# Patient Record
Sex: MALE | Race: WHITE | NOT HISPANIC OR LATINO | Employment: FULL TIME | URBAN - METROPOLITAN AREA
[De-identification: names, ages, dates, MRNs, and addresses within clinical notes are randomized per-mention and may not be internally consistent; named-entity substitution may affect disease eponyms.]

---

## 2023-10-07 ENCOUNTER — LAB (OUTPATIENT)
Dept: LAB | Facility: LAB | Age: 43
End: 2023-10-07
Payer: COMMERCIAL

## 2023-10-07 DIAGNOSIS — Z12.5 ENCOUNTER FOR SCREENING FOR MALIGNANT NEOPLASM OF PROSTATE: ICD-10-CM

## 2023-10-07 DIAGNOSIS — Z00.00 ENCOUNTER FOR GENERAL ADULT MEDICAL EXAMINATION WITHOUT ABNORMAL FINDINGS: Primary | ICD-10-CM

## 2023-10-07 LAB
ALBUMIN SERPL BCP-MCNC: 4.4 G/DL (ref 3.4–5)
ALP SERPL-CCNC: 73 U/L (ref 33–120)
ALT SERPL W P-5'-P-CCNC: 16 U/L (ref 10–52)
ANION GAP SERPL CALC-SCNC: 14 MMOL/L (ref 10–20)
AST SERPL W P-5'-P-CCNC: 12 U/L (ref 9–39)
BILIRUB SERPL-MCNC: 0.8 MG/DL (ref 0–1.2)
BUN SERPL-MCNC: 25 MG/DL (ref 6–23)
CALCIUM SERPL-MCNC: 9.4 MG/DL (ref 8.6–10.6)
CHLORIDE SERPL-SCNC: 107 MMOL/L (ref 98–107)
CHOLEST SERPL-MCNC: 196 MG/DL (ref 0–199)
CHOLESTEROL/HDL RATIO: 4.6
CO2 SERPL-SCNC: 25 MMOL/L (ref 21–32)
CREAT SERPL-MCNC: 1.21 MG/DL (ref 0.5–1.3)
ERYTHROCYTE [DISTWIDTH] IN BLOOD BY AUTOMATED COUNT: 11.9 % (ref 11.5–14.5)
EST. AVERAGE GLUCOSE BLD GHB EST-MCNC: 97 MG/DL
GFR SERPL CREATININE-BSD FRML MDRD: 78 ML/MIN/1.73M*2
GLUCOSE SERPL-MCNC: 99 MG/DL (ref 74–99)
HBA1C MFR BLD: 5 %
HCT VFR BLD AUTO: 45.5 % (ref 41–52)
HDLC SERPL-MCNC: 42.2 MG/DL
HGB BLD-MCNC: 15.9 G/DL (ref 13.5–17.5)
LDLC SERPL CALC-MCNC: 133 MG/DL (ref 140–190)
MCH RBC QN AUTO: 30.8 PG (ref 26–34)
MCHC RBC AUTO-ENTMCNC: 34.9 G/DL (ref 32–36)
MCV RBC AUTO: 88 FL (ref 80–100)
NON HDL CHOLESTEROL: 154 MG/DL (ref 0–149)
NRBC BLD-RTO: 0 /100 WBCS (ref 0–0)
PLATELET # BLD AUTO: 290 X10*3/UL (ref 150–450)
PMV BLD AUTO: 9.8 FL (ref 7.5–11.5)
POTASSIUM SERPL-SCNC: 4.6 MMOL/L (ref 3.5–5.3)
PROT SERPL-MCNC: 6.9 G/DL (ref 6.4–8.2)
PSA SERPL-MCNC: 0.61 NG/ML
RBC # BLD AUTO: 5.16 X10*6/UL (ref 4.5–5.9)
SODIUM SERPL-SCNC: 141 MMOL/L (ref 136–145)
TRIGL SERPL-MCNC: 104 MG/DL (ref 0–149)
VLDL: 21 MG/DL (ref 0–40)
WBC # BLD AUTO: 7.8 X10*3/UL (ref 4.4–11.3)

## 2023-10-07 PROCEDURE — 84153 ASSAY OF PSA TOTAL: CPT

## 2023-10-07 PROCEDURE — 80061 LIPID PANEL: CPT

## 2023-10-07 PROCEDURE — 85027 COMPLETE CBC AUTOMATED: CPT

## 2023-10-07 PROCEDURE — 83036 HEMOGLOBIN GLYCOSYLATED A1C: CPT

## 2023-10-07 PROCEDURE — 36415 COLL VENOUS BLD VENIPUNCTURE: CPT

## 2023-10-07 PROCEDURE — 80053 COMPREHEN METABOLIC PANEL: CPT

## 2024-02-07 ENCOUNTER — TELEPHONE (OUTPATIENT)
Dept: PRIMARY CARE | Facility: CLINIC | Age: 44
End: 2024-02-07
Payer: COMMERCIAL

## 2024-02-07 DIAGNOSIS — G56.00 CARPAL TUNNEL SYNDROME, UNSPECIFIED LATERALITY: Primary | ICD-10-CM

## 2024-02-07 NOTE — TELEPHONE ENCOUNTER
Patients wife called in requesting a referral due to possible carpel tunnel - stated he has to text a lot for his job and has a lot of pain in his hands. Please advise if referral can be placed.

## 2024-02-29 ENCOUNTER — OFFICE VISIT (OUTPATIENT)
Dept: UROLOGY | Facility: HOSPITAL | Age: 44
End: 2024-02-29
Payer: COMMERCIAL

## 2024-02-29 DIAGNOSIS — R97.20 ELEVATED PSA: Primary | ICD-10-CM

## 2024-02-29 LAB
POC APPEARANCE, URINE: CLEAR
POC BILIRUBIN, URINE: NEGATIVE
POC BLOOD, URINE: NEGATIVE
POC COLOR, URINE: YELLOW
POC GLUCOSE, URINE: NEGATIVE MG/DL
POC KETONES, URINE: NEGATIVE MG/DL
POC LEUKOCYTES, URINE: NEGATIVE
POC NITRITE,URINE: NEGATIVE
POC PH, URINE: 6.5 PH
POC PROTEIN, URINE: NEGATIVE MG/DL
POC SPECIFIC GRAVITY, URINE: >=1.03
POC UROBILINOGEN, URINE: 0.2 EU/DL

## 2024-02-29 PROCEDURE — 99204 OFFICE O/P NEW MOD 45 MIN: CPT | Performed by: STUDENT IN AN ORGANIZED HEALTH CARE EDUCATION/TRAINING PROGRAM

## 2024-02-29 PROCEDURE — 99214 OFFICE O/P EST MOD 30 MIN: CPT | Performed by: STUDENT IN AN ORGANIZED HEALTH CARE EDUCATION/TRAINING PROGRAM

## 2024-02-29 PROCEDURE — 81003 URINALYSIS AUTO W/O SCOPE: CPT | Performed by: STUDENT IN AN ORGANIZED HEALTH CARE EDUCATION/TRAINING PROGRAM

## 2024-02-29 NOTE — PROGRESS NOTES
Subjective   Patient ID: Arsenio Murphy is a 43 y.o. male    HPI  43 y.o. male who presented with a recurrence of blood in his semen, initially noted in 2020 during the COVID pandemic. At that time, he was diagnosed with a presumed prostate inflammation and successfully treated with antibiotics. He underwent a vasectomy in July 2023. Early in 2024, he experienced a similar episode of hematospermia. Unable to see his previous urologist, he [facility] and was prescribed a course of antibiotics, which resolved the issue after an extended two-week course. He denies any issues with urination, erections, or ejaculation. He was referred to oncology in error and subsequently to our clinic for further evaluation. He has no other significant medical history and takes no medications.    The most recent urinalysis, conducted on 2/29/2024, was unremarkable     Review of Systems    All systems were reviewed. Anything negative was noted in the HPI.    Objective   Physical Exam    General: Well developed, well nourished, alert and cooperative, appears in no acute distress   Eyes: Non-injected conjunctiva, sclera clear, no proptosis   Cardiac: Extremities are warm and well perfused. No edema, cyanosis or pallor   Lungs: Breathing is easy, non-labored. Speaking in clear and complete sentences. Normal diaphragmatic movement   MSK: Ambulatory with steady gait, unassisted   Neuro: Alert and oriented to person, place, and time   Psych: Demonstrates good judgment and reason, without hallucinations, abnormal affect or abnormal behaviors   Skin: No obvious lesions, no rashes       No CVA tenderness bilaterally   No suprapubic pain or discomfort   Normal genital exam  Normal testicular exam  Normal CALDERON, no nodule no tenderness     No past medical history on file.      No past surgical history on file.      Assessment/Plan   Hematospermia    43 y.o. male who presents for the above condition, We had a very long and extensive  discussion with the patient regarding the pathophysiology, differential diagnosis, risk factor, management, natural history, incidence and diagnostic work-up of the condition.      No antibiotics are prescribed as there are no signs of prostatitis or urinary infection. The patient is educated on the benign nature of hematospermia and reassured. An MRI of the prostate is discussed but not indicated at this time due to the absence of concerning symptoms and the patient's age. The patient is advised to monitor the situation and return if symptoms persist or recur frequently.    Plan:  - Follow up in 6 months          Scribe Attestation  By signing my name below, IKary Scribe   attest that this documentation has been prepared under the direction and in the presence of Dr. Maxx Toscano

## 2024-03-11 ENCOUNTER — APPOINTMENT (OUTPATIENT)
Dept: UROLOGY | Facility: CLINIC | Age: 44
End: 2024-03-11
Payer: COMMERCIAL

## 2024-04-05 DIAGNOSIS — R79.9: Primary | ICD-10-CM

## 2024-08-06 ENCOUNTER — APPOINTMENT (OUTPATIENT)
Dept: UROLOGY | Facility: HOSPITAL | Age: 44
End: 2024-08-06
Payer: COMMERCIAL

## 2024-08-13 ENCOUNTER — APPOINTMENT (OUTPATIENT)
Dept: UROLOGY | Facility: HOSPITAL | Age: 44
End: 2024-08-13
Payer: COMMERCIAL

## 2024-09-26 ENCOUNTER — OFFICE VISIT (OUTPATIENT)
Dept: PRIMARY CARE | Facility: CLINIC | Age: 44
End: 2024-09-26
Payer: COMMERCIAL

## 2024-09-26 VITALS
SYSTOLIC BLOOD PRESSURE: 120 MMHG | WEIGHT: 195 LBS | TEMPERATURE: 97.8 F | DIASTOLIC BLOOD PRESSURE: 76 MMHG | OXYGEN SATURATION: 98 % | HEART RATE: 67 BPM | BODY MASS INDEX: 27.2 KG/M2

## 2024-09-26 DIAGNOSIS — Z00.00 ADULT GENERAL MEDICAL EXAM: Primary | ICD-10-CM

## 2024-09-26 DIAGNOSIS — Z12.5 PROSTATE CANCER SCREENING: ICD-10-CM

## 2024-09-26 DIAGNOSIS — Z83.79 FAMILY HISTORY OF ULCERATIVE COLITIS: ICD-10-CM

## 2024-09-26 DIAGNOSIS — M25.561 ACUTE PAIN OF RIGHT KNEE: ICD-10-CM

## 2024-09-26 PROCEDURE — 99396 PREV VISIT EST AGE 40-64: CPT | Performed by: INTERNAL MEDICINE

## 2024-09-26 PROCEDURE — 1036F TOBACCO NON-USER: CPT | Performed by: INTERNAL MEDICINE

## 2024-09-26 ASSESSMENT — PROMIS GLOBAL HEALTH SCALE
CARRYOUT_PHYSICAL_ACTIVITIES: COMPLETELY
RATE_GENERAL_HEALTH: EXCELLENT
RATE_SOCIAL_SATISFACTION: EXCELLENT
RATE_QUALITY_OF_LIFE: EXCELLENT
RATE_PHYSICAL_HEALTH: VERY GOOD
EMOTIONAL_PROBLEMS: NEVER
RATE_MENTAL_HEALTH: EXCELLENT
RATE_AVERAGE_PAIN: 0
CARRYOUT_SOCIAL_ACTIVITIES: EXCELLENT

## 2024-09-26 ASSESSMENT — PAIN SCALES - GENERAL: PAINLEVEL: 0-NO PAIN

## 2024-09-26 ASSESSMENT — PATIENT HEALTH QUESTIONNAIRE - PHQ9
SUM OF ALL RESPONSES TO PHQ9 QUESTIONS 1 AND 2: 0
2. FEELING DOWN, DEPRESSED OR HOPELESS: NOT AT ALL
1. LITTLE INTEREST OR PLEASURE IN DOING THINGS: NOT AT ALL

## 2024-09-26 NOTE — PROGRESS NOTES
Subjective   Patient ID: Arsenio Murphy is a 43 y.o. male who presents for Annual Exam.    HPI  1.  Physical exam.  Pt states that he played in a pickle ball tournament and started to experience right knee pain shortly thereafter. He denies swelling, but is concerned about injury. He is interested in an MRI, but does not have time for physical therapy if his insurance requires it as a prerequisite.   Pt has a family history of UC and is requesting a C-scope.  Immunizations: declines flu shot    Review of Systems  All pertinent positive symptoms are included in the history of present illness.    All other systems have been reviewed and are negative and noncontributory to this patient's current ailments.    History reviewed. No pertinent past medical history.  History reviewed. No pertinent surgical history.  Social History     Tobacco Use    Smoking status: Never    Smokeless tobacco: Never     Family History   Problem Relation Name Age of Onset    Ulcerative colitis Father       No Known Allergies  Immunization History   Administered Date(s) Administered    Pfizer Purple Cap SARS-CoV-2 04/12/2021, 05/04/2021, 11/14/2021     No current outpatient medications  Objective   Visit Vitals  /76   Pulse 67   Temp 36.6 °C (97.8 °F)   Wt 88.5 kg (195 lb)   SpO2 98%   BMI 27.20 kg/m²   Smoking Status Never   BSA 2.11 m²     No visits with results within 1 Month(s) from this visit.   Latest known visit with results is:   Office Visit on 02/29/2024   Component Date Value    POC Color, Urine 02/29/2024 Yellow     POC Appearance, Urine 02/29/2024 Clear     POC Glucose, Urine 02/29/2024 NEGATIVE     POC Bilirubin, Urine 02/29/2024 NEGATIVE     POC Ketones, Urine 02/29/2024 NEGATIVE     POC Specific Gravity, Ur* 02/29/2024 >=1.030     POC Blood, Urine 02/29/2024 NEGATIVE     POC PH, Urine 02/29/2024 6.5     POC Protein, Urine 02/29/2024 NEGATIVE     POC Urobilinogen, Urine 02/29/2024 0.2     Poc Nitrite, Urine 02/29/2024  NEGATIVE     POC Leukocytes, Urine 02/29/2024 NEGATIVE      Physical Exam  CONSTITUTIONAL - well nourished, well developed, looks like stated age, in no acute distress, not ill-appearing, and not tired appearing  SKIN - normal skin color and pigmentation, normal skin turgor without rash, lesions, or nodules visualized  HEAD - no trauma, normocephalic  EYES - pupils are equal and reactive to light, extraocular muscles are intact, and normal external exam  ENT - TM's intact, no injection, no signs of infection, uvula midline, normal tongue movement and throat normal, no exudate, nasal passage without discharge and patent  NECK - supple without rigidity, no neck mass was observed, no thyromegaly or thyroid nodules  CHEST - clear to auscultation, no wheezing, no crackles and no rales, good effort  CARDIAC - regular rate and regular rhythm, no skipped beats, no murmur  ABDOMEN - no organomegaly, soft, nontender, nondistended, normal bowel sounds, no guarding/rebound/rigidity, negative McBurney sign and negative Heaton sign  EXTREMITIES - no edema, no deformities, FROM, no tenderness  NEUROLOGICAL - normal gait, normal balance, normal motor, no ataxia; alert, oriented and no focal signs  PSYCHIATRIC - alert, pleasant and cordial, age-appropriate  IMMUNOLOGIC - no cervical lymphadenopathy    Assessment/Plan   Problem List Items Addressed This Visit    None  Visit Diagnoses       Adult general medical exam    -  Primary    Relevant Orders    Lipid Panel    Comprehensive Metabolic Panel    CBC    Acute pain of right knee        Relevant Orders    XR knee right 3 views    MR knee right wo IV contrast    Referral to Sports Medicine    Family history of ulcerative colitis        Relevant Orders    Colonoscopy Screening; Average Risk Patient    Prostate cancer screening        Relevant Orders    PSA

## 2024-10-07 ENCOUNTER — HOSPITAL ENCOUNTER (OUTPATIENT)
Dept: RADIOLOGY | Facility: CLINIC | Age: 44
Discharge: HOME | End: 2024-10-07
Payer: COMMERCIAL

## 2024-10-07 ENCOUNTER — LAB (OUTPATIENT)
Dept: LAB | Facility: LAB | Age: 44
End: 2024-10-07
Payer: COMMERCIAL

## 2024-10-07 DIAGNOSIS — M25.561 ACUTE PAIN OF RIGHT KNEE: ICD-10-CM

## 2024-10-07 DIAGNOSIS — Z00.00 ADULT GENERAL MEDICAL EXAM: ICD-10-CM

## 2024-10-07 DIAGNOSIS — Z12.5 PROSTATE CANCER SCREENING: ICD-10-CM

## 2024-10-07 LAB
ALBUMIN SERPL BCP-MCNC: 4.7 G/DL (ref 3.4–5)
ALP SERPL-CCNC: 61 U/L (ref 33–120)
ALT SERPL W P-5'-P-CCNC: 16 U/L (ref 10–52)
ANION GAP SERPL CALC-SCNC: 10 MMOL/L (ref 10–20)
AST SERPL W P-5'-P-CCNC: 15 U/L (ref 9–39)
BILIRUB SERPL-MCNC: 1.7 MG/DL (ref 0–1.2)
BUN SERPL-MCNC: 14 MG/DL (ref 6–23)
CALCIUM SERPL-MCNC: 9.3 MG/DL (ref 8.6–10.6)
CHLORIDE SERPL-SCNC: 103 MMOL/L (ref 98–107)
CHOLEST SERPL-MCNC: 189 MG/DL (ref 0–199)
CHOLESTEROL/HDL RATIO: 3.9
CO2 SERPL-SCNC: 31 MMOL/L (ref 21–32)
CREAT SERPL-MCNC: 0.93 MG/DL (ref 0.5–1.3)
EGFRCR SERPLBLD CKD-EPI 2021: >90 ML/MIN/1.73M*2
ERYTHROCYTE [DISTWIDTH] IN BLOOD BY AUTOMATED COUNT: 11.9 % (ref 11.5–14.5)
GLUCOSE SERPL-MCNC: 91 MG/DL (ref 74–99)
HCT VFR BLD AUTO: 45.5 % (ref 41–52)
HDLC SERPL-MCNC: 48.9 MG/DL
HGB BLD-MCNC: 15.3 G/DL (ref 13.5–17.5)
LDLC SERPL CALC-MCNC: 119 MG/DL
MCH RBC QN AUTO: 30.2 PG (ref 26–34)
MCHC RBC AUTO-ENTMCNC: 33.6 G/DL (ref 32–36)
MCV RBC AUTO: 90 FL (ref 80–100)
NON HDL CHOLESTEROL: 140 MG/DL (ref 0–149)
NRBC BLD-RTO: 0 /100 WBCS (ref 0–0)
PLATELET # BLD AUTO: 258 X10*3/UL (ref 150–450)
POTASSIUM SERPL-SCNC: 4.3 MMOL/L (ref 3.5–5.3)
PROT SERPL-MCNC: 6.9 G/DL (ref 6.4–8.2)
PSA SERPL-MCNC: 0.43 NG/ML
RBC # BLD AUTO: 5.06 X10*6/UL (ref 4.5–5.9)
SODIUM SERPL-SCNC: 140 MMOL/L (ref 136–145)
TRIGL SERPL-MCNC: 108 MG/DL (ref 0–149)
VLDL: 22 MG/DL (ref 0–40)
WBC # BLD AUTO: 5.3 X10*3/UL (ref 4.4–11.3)

## 2024-10-07 PROCEDURE — 84153 ASSAY OF PSA TOTAL: CPT

## 2024-10-07 PROCEDURE — 80061 LIPID PANEL: CPT

## 2024-10-07 PROCEDURE — 36415 COLL VENOUS BLD VENIPUNCTURE: CPT

## 2024-10-07 PROCEDURE — 73562 X-RAY EXAM OF KNEE 3: CPT | Mod: RIGHT SIDE | Performed by: RADIOLOGY

## 2024-10-07 PROCEDURE — 73562 X-RAY EXAM OF KNEE 3: CPT | Mod: RT

## 2024-10-07 PROCEDURE — 85027 COMPLETE CBC AUTOMATED: CPT

## 2024-10-07 PROCEDURE — 80053 COMPREHEN METABOLIC PANEL: CPT

## 2024-10-10 ENCOUNTER — OFFICE VISIT (OUTPATIENT)
Dept: SPORTS MEDICINE | Facility: CLINIC | Age: 44
End: 2024-10-10
Payer: COMMERCIAL

## 2024-10-10 VITALS
SYSTOLIC BLOOD PRESSURE: 120 MMHG | HEIGHT: 71 IN | WEIGHT: 195 LBS | BODY MASS INDEX: 27.3 KG/M2 | DIASTOLIC BLOOD PRESSURE: 80 MMHG | HEART RATE: 68 BPM

## 2024-10-10 DIAGNOSIS — M71.21 BAKER'S CYST OF KNEE, RIGHT: ICD-10-CM

## 2024-10-10 DIAGNOSIS — M21.70 LEG LENGTH DISCREPANCY: ICD-10-CM

## 2024-10-10 DIAGNOSIS — M25.561 ACUTE PAIN OF RIGHT KNEE: ICD-10-CM

## 2024-10-10 DIAGNOSIS — S83.8X2A ACUTE MEDIAL MENISCAL INJURY OF KNEE, LEFT, INITIAL ENCOUNTER: ICD-10-CM

## 2024-10-10 DIAGNOSIS — M25.861 PATELLOFEMORAL DYSFUNCTION OF RIGHT KNEE: Primary | ICD-10-CM

## 2024-10-10 DIAGNOSIS — S83.419A SPRAIN OF MEDIAL COLLATERAL LIGAMENT OF KNEE, INITIAL ENCOUNTER: ICD-10-CM

## 2024-10-10 PROCEDURE — 3008F BODY MASS INDEX DOCD: CPT | Performed by: FAMILY MEDICINE

## 2024-10-10 PROCEDURE — 1036F TOBACCO NON-USER: CPT | Performed by: FAMILY MEDICINE

## 2024-10-10 PROCEDURE — 99214 OFFICE O/P EST MOD 30 MIN: CPT | Performed by: FAMILY MEDICINE

## 2024-10-10 ASSESSMENT — ENCOUNTER SYMPTOMS
OCCASIONAL FEELINGS OF UNSTEADINESS: 0
LOSS OF SENSATION IN FEET: 0
DEPRESSION: 0

## 2024-10-10 ASSESSMENT — PAIN SCALES - GENERAL
PAINLEVEL: 6
PAINLEVEL_OUTOF10: 1

## 2024-10-10 ASSESSMENT — COLUMBIA-SUICIDE SEVERITY RATING SCALE - C-SSRS
6. HAVE YOU EVER DONE ANYTHING, STARTED TO DO ANYTHING, OR PREPARED TO DO ANYTHING TO END YOUR LIFE?: NO
1. IN THE PAST MONTH, HAVE YOU WISHED YOU WERE DEAD OR WISHED YOU COULD GO TO SLEEP AND NOT WAKE UP?: NO
2. HAVE YOU ACTUALLY HAD ANY THOUGHTS OF KILLING YOURSELF?: NO

## 2024-10-10 ASSESSMENT — PATIENT HEALTH QUESTIONNAIRE - PHQ9
1. LITTLE INTEREST OR PLEASURE IN DOING THINGS: NOT AT ALL
SUM OF ALL RESPONSES TO PHQ9 QUESTIONS 1 AND 2: 0
2. FEELING DOWN, DEPRESSED OR HOPELESS: NOT AT ALL

## 2024-10-10 ASSESSMENT — PAIN - FUNCTIONAL ASSESSMENT: PAIN_FUNCTIONAL_ASSESSMENT: 0-10

## 2024-10-10 ASSESSMENT — PAIN DESCRIPTION - DESCRIPTORS: DESCRIPTORS: ACHING;TENDER

## 2024-10-10 NOTE — PROGRESS NOTES
Verbal consent of the patient and/or verbal parental consent for patients under the age of 18 have been obtained to conduct a physical examination at this office visit.    New patient  History Of Present Illness  10/10/24 Arsenio Murphy is a 43 y.o. male who presents for an evaluation of their Right knee.  He was graciously referred by Janae Goncalves MD. Has MRI of R knee scheduled for 10/16/24. States that he has been dealing with right knee pain since August. 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 No mechanism of injury and does not recall pain 24 days post activity. States that his pain occurred later that week.  States that end of ROM he has pain and limited ROM. States that he is at least finally able to bear weight fully no pain. States his pain is located posteriorly medially. States that his pain has progressively gotten better however he says periodically off and on it still flares and up tremendously and is 10 out of 10 pain. States that he has rested and has only been light walking. He is active with pickelball golfer and walker but it is affecting his activities of daily living as well as work. States that he has a tender 1/10 pain normally, if he hyperflexes he flares up to a 6/10. No history of knee injury. No pops snaps or cracks. States for pain management he has decreased activity.  NSAIDs and Tylenol does not seem to help or ice or heat.    All previous Progress Notes and imaging results related to this patients chief complaint have been reviewed in preparation for this examination.    Past Medical History  He has no past medical history on file.    Surgical History  He has no past surgical history on file.     Social History  He reports that he has never smoked. He has never used smokeless tobacco. No history on file for alcohol use and drug use.    Family History  Family History   Problem Relation Name Age of Onset    Ulcerative colitis Father           Allergies  Patient has no known allergies.    Review of Systems  CONSTITUTIONAL:   Negative for weight change, loss of appetite, fatigue, weakness, fever, chills, night sweats, headaches .           HEENT:   Negative for cold, cough, sore throat, sinus pain, swollen lymph nodes.           OPHTHALMOLOGY:   Negative for diminished vision, blurred vision, loss of vision, double vision.           ALLERGY:   Negative for runny nose, scratchy throat, sinus congestion, rash, facial pressure, nasal congestion, post-nasal drip.           CARDIOLOGY:   Negative for chest pain, palpitations, murmurs, irregular heart beat, shortness of breath, leg edema, dyspnea on exertion, fatigue, dizziness.           RESPIRATORY:   Negative for chest pain, shortness of breath, swelling of the legs, asthma/copd, chest congestion, pain with breathing .           GASTROENTEROLOGY:   Negative for nausea, vomitting, heartburn, constipation, diarrhea, blood in stool, change in bowel habits, black stool.           HEMATOLOGY/LYMPH:   Negative for fatigue, loss of appetitie, easy bruising, easy bleeding, anemia, abnormal bleeding, slow healing.           ENDOCRINOLOGY:   Negative for polyuria, polydipsia, polyphagia, fatigue, weight loss, weight gain, cold intolerance, heat intolerance, diabetes.           MUSCULOSKELETAL:   Positive  for Right knee        DERMATOLOGY:   Negative for rash, bruising.           NEUROLOGY:   Negative for tingling, numbness, gait abnormality, paresthesias, weakness, sciatica.        Examination:  Right Medial Joint Line over the Medial Meniscus and Posterior Midline of the Knee  Edema: Positive  Diffusely.   Effusion: Negative.   Percussion Test:  Negative.  Tuning Fork Test:  Negative.  Ecchymosis/Bruising: Negative.            Palpation:   Positive  Right  Medial Joint Line over the Medial Meniscus and Posterior Midline of the Knee    Orientation: Asymmetrical: because of the swelling.     Range of Motion:     Positive Knee Flexion ( 0-135 degrees) Decreased because of pain and swelling  Positive Knee Extension ( 0-15 degrees) Decreased because of pain and swelling           Muscle Strength:    Positive +4/+5 Quadricep Extension Causes pain  Positive +4/+5 Hamstring Flexion Causes pain  +5+5 Gastrocnemius  +5+5 Soleus.            Proprioception:   Pain with Squat: Positive   Pain with Sumo Squat:Positive   Hop Test: Negative  Single leg balance test Negative           Vascular:   +2/+4 Femoral  +2/+4 Dorsalis Pedis  +2/+4 Posterior Tibial  Capillary Refill less than 2 seconds.                Knee - ACL:  Anterior Drawer Test: Negative  Lachman Test: Negative  Lelli Test: Negative               KNEE - MCL / LCL:  Valgus Stress Test: 90 degrees: Positive  20-30 degrees: Positive    Varus Stress Test:  90 degrees: Negative, 20-30 degrees: Negative.   Apley Distraction Test: Positive  Medial.   Thessaly Test: Positive  Origin and Insertion MCL, Anterior Medial Meniscus, Posterior Medial Meniscus, Distal Hamstring.            KNEE - MENISCUS:  Apley Compression Test:  Positive Medial joint line.          Stienmann Test:  Positive   Lesvia Test:  Positive Medial joint line.         Bounce Home Test:  Negative   Thessaly Test:  Negative            KNEE - PATELLA:  Apprehension Test:  Positive   Glide Test:  Negative  Grind Test: Positive   Patella Tracking TestPositive              KNEE - QUADRICEPS:         VMO Test: Positive   VLO Test:  Negative    Hip/Pelvis - Sacrum:  Leg Length Supine: Positive LEFT leg shorter than the Right    Leg Length Supine to Seated (Derbolowsky Sign): Positive LEFT leg shorter than the Right   Standing Flexion Test: Positive Positive Right  Seated Flexion Test: Positive Positive Right  Spring Test: Negative   Sacral Somatic Dysfunction: Positive LrLa: Left rotation Left axis  Hip Flexor Tightness: Positive RIGHT > Left  Hamstring Tightness: Positive LEFT > Right          Feet/Foot: Positive  BILATERAL Valgus foot        Imaging and Diagnostics Review:  Standing erect pelvis x-ray ordered for leg length discrepancy   MRI Scheduled for 10/16/24    Assessment   1. Patellofemoral dysfunction of right knee        2. Acute pain of right knee  Referral to Sports Medicine    XR pelvis 1-2 views    Referral to Physical Therapy      3. Baker's cyst of knee, right  XR pelvis 1-2 views    Referral to Physical Therapy      4. Sprain of medial collateral ligament of knee, initial encounter  XR pelvis 1-2 views    Referral to Physical Therapy      5. Acute medial meniscal injury of knee, left, initial encounter  XR pelvis 1-2 views    Referral to Physical Therapy      6. Leg length discrepancy            Treatment or Intervention:  May continue to alternate ice and moist heat as needed  ,   Reviewed handout degenerative joint disease of the knees, meniscus injury, patellofemoral tracking syndrome, and/or patellar tendinitis in detail with the patient to the level of their understanding; a copy of this handout was provided to the patient at the time of this office visit.  ,   Start into Physical Therapy 1-2 times a week for 8-10 weeks with manual therapy as well as dry needling and IASTM especially VMO and adductor strengthening  Reviewed home exercises to be performed by the patient routinely  ,   Stressed the importance of wearing shoes with good stability control to help with the biomechanics affecting the knees and lower extremities  ,   Stressed the importance of wearing full foot insoles to help with the biomechanics affecting the knees and lower extremities  ,   Recommendation over-the-counter  vitamin-D 2 -3000+ milligrams a day, as well as a daily multivitamin.  ,   Recommendation over-the-counter curcumin, turmeric, boswellia, as well as egg shell membrane as directed to aid with joint inflammation.  ,   Recommendation over-the-counter Move Free for joint health.  ,    Discussed in detail with the patient to  the level of their understanding the possibility in the future of regenerative injections versus corticosteroid injections versus viscosupplementation injections versus a combination  ,   AT the patient's next office visit, will provide appropriate __ millimeter heel lift to be placed in the RIGHT/LEFT shoe to accommodate for leg length discrepancy found on standing erect pelvis xray  , and   FOLLOW UP AFTER MRI left knee  Please note that this report has been produced using speech recognition software.  It may contain errors related to grammar, punctuation or spelling.  Electronically signed, but not reviewed.  SOSA Jasso, Director of Sports Medicine      RM FARLEY on 10/10/24 at 2:31 PM.     HERMELINDA Jasso DO

## 2024-10-10 NOTE — PATIENT INSTRUCTIONS
May continue to alternate ice and moist heat as needed  ,   Reviewed handout degenerative joint disease of the knees, patellofemoral tracking syndrome, and/or patellar tendinitis in detail with the patient to the level of their understanding; a copy of this handout was provided to the patient at the time of this office visit.  ,   Start into Physical Therapy 1-2 times a week for 8-10 weeks with manual therapy as well as dry needling and IASTM  ,   Reviewed home exercises to be performed by the patient routinely  ,   Stressed the importance of wearing shoes with good stability control to help with the biomechanics affecting the knees and lower extremities  ,   Stressed the importance of wearing full foot insoles to help with the biomechanics affecting the knees and lower extremities  ,   Recommendation over-the-counter calcium with vitamin-D 2 -3000+ milligrams a day, as well as OTC symphytum as directed daily to promote bony healing, in addition to a daily multivitamin.  ,   Recommendation over-the-counter curcumin, turmeric, boswellia, as well as egg shell membrane as directed to aid with joint inflammation.  ,   Recommendation over-the-counter Move Free for joint health.  ,    Discussed in detail with the patient to the level of their understanding the possibility in the future of regenerative injections versus corticosteroid injections versus viscosupplementation injections versus a combination  ,   AT the patient's next office visit, will provide appropriate __ millimeter heel lift to be placed in the RIGHT/LEFT shoe to accommodate for leg length discrepancy found on standing erect pelvis xray  , and   FOLLOW UP AFTER MRI

## 2024-10-16 ENCOUNTER — HOSPITAL ENCOUNTER (OUTPATIENT)
Dept: RADIOLOGY | Facility: CLINIC | Age: 44
Discharge: HOME | End: 2024-10-16
Payer: COMMERCIAL

## 2024-10-16 DIAGNOSIS — M25.561 ACUTE PAIN OF RIGHT KNEE: ICD-10-CM

## 2024-10-16 PROCEDURE — 73721 MRI JNT OF LWR EXTRE W/O DYE: CPT | Mod: RT

## 2024-10-16 PROCEDURE — 73721 MRI JNT OF LWR EXTRE W/O DYE: CPT | Mod: RIGHT SIDE | Performed by: RADIOLOGY

## 2024-10-16 NOTE — PROGRESS NOTES
Verbal consent of the patient and/or verbal parental consent for patients under the age of 18 have been obtained to conduct a physical examination at this office visit.    Established patient  History Of Present Illness  10/18/24 Arsenio Murphy is a 43 y.o. male who presents for an MRI follow up of their Right knee. States that he Is having same amount of pain and discomfort. No new or worsening symptoms.  He has not started into physical therapy yet.  He also has not started on any other supplements yet either.  We talked in detail about his MRI results as well as the exercises I talked him about previously to strengthen his VMO and his adductors.  Also told him I recommended he get started into physical therapy right away.  We also talked about joint lubrication shots and regenerative injections potentially in the future.  He continues to wear knee sleeve with activities such as pickleball and basketball.      All previous Progress Notes and imaging results related to this patients chief complaint have been reviewed in preparation for this examination.    Past Medical History  He has no past medical history on file.    Surgical History  He has no past surgical history on file.     Social History  He reports that he has never smoked. He has never used smokeless tobacco. No history on file for alcohol use and drug use.    Family History  Family History   Problem Relation Name Age of Onset    Ulcerative colitis Father          Allergies  Patient has no known allergies.    History Of Present Illness  10/10/24 Arsenio Murphy is a 43 y.o. male who presents for an evaluation of their Right knee.  He was graciously referred by Janae Goncalves MD. Has MRI of R knee scheduled for 10/16/24. States that he has been dealing with right knee pain since August. 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 No mechanism of injury and does not recall pain 24 days post activity. States that his pain  occurred later that week.  States that end of ROM he has pain and limited ROM. States that he is at least finally able to bear weight fully no pain. States his pain is located posteriorly medially. States that his pain has progressively gotten better however he says periodically off and on it still flares and up tremendously and is 10 out of 10 pain. States that he has rested and has only been light walking. He is active with pickelball golfer and walker but it is affecting his activities of daily living as well as work. States that he has a tender 1/10 pain normally, if he hyperflexes he flares up to a 6/10. No history of knee injury. No pops snaps or cracks. States for pain management he has decreased activity.  NSAIDs and Tylenol does not seem to help or ice or heat.    Review of Systems  CONSTITUTIONAL:   Negative for weight change, loss of appetite, fatigue, weakness, fever, chills, night sweats, headaches .           HEENT:   Negative for cold, cough, sore throat, sinus pain, swollen lymph nodes.           OPHTHALMOLOGY:   Negative for diminished vision, blurred vision, loss of vision, double vision.           ALLERGY:   Negative for runny nose, scratchy throat, sinus congestion, rash, facial pressure, nasal congestion, post-nasal drip.           CARDIOLOGY:   Negative for chest pain, palpitations, murmurs, irregular heart beat, shortness of breath, leg edema, dyspnea on exertion, fatigue, dizziness.           RESPIRATORY:   Negative for chest pain, shortness of breath, swelling of the legs, asthma/copd, chest congestion, pain with breathing .           GASTROENTEROLOGY:   Negative for nausea, vomitting, heartburn, constipation, diarrhea, blood in stool, change in bowel habits, black stool.           HEMATOLOGY/LYMPH:   Negative for fatigue, loss of appetitie, easy bruising, easy bleeding, anemia, abnormal bleeding, slow healing.           ENDOCRINOLOGY:   Negative for polyuria, polydipsia, polyphagia,  fatigue, weight loss, weight gain, cold intolerance, heat intolerance, diabetes.           MUSCULOSKELETAL:   Positive  for Right knee        DERMATOLOGY:   Negative for rash, bruising.           NEUROLOGY:   Negative for tingling, numbness, gait abnormality, paresthesias, weakness, sciatica.        Examination: All findings relatively unchanged since previous visit  Right Medial Joint Line over the Medial Meniscus and Posterior Midline of the Knee  Edema: Negative  Effusion: Negative.   Percussion Test:  Negative.  Tuning Fork Test:  Negative.  Ecchymosis/Bruising: Negative.            Palpation:   Positive  Right  Medial Joint Line over the Medial Meniscus  Orientation: Symmetrical    Range of Motion:  FROM  Knee Flexion ( 0-135 degrees)   Knee Extension ( 0-15 degrees)            Muscle Strength:    +5/+5 Quadricep Extension   +5/+5 Hamstring Flexion   +5+5 Gastrocnemius  +5+5 Soleus.            Proprioception:  All findings relatively unchanged since previous visit  Pain with Squat: Positive   Pain with Sumo Squat:Positive   Hop Test: Negative  Single leg balance test Negative           Vascular:   +2/+4 Femoral  +2/+4 Dorsalis Pedis  +2/+4 Posterior Tibial  Capillary Refill less than 2 seconds.                Knee - ACL:  Anterior Drawer Test: Negative  Lachman Test: Negative  Lelli Test: Negative               KNEE - MCL / LCL: All findings relatively unchanged since previous visit  Valgus Stress Test: 90 degrees: Negative  Varus Stress Test:  90 degrees: Negative, 20-30 degrees: Negative.   Apley Distraction Test: Negative  Thessaly Test: Negative           KNEE - MENISCUS: All findings relatively unchanged since previous visit  Apley Compression Test:  Positive Medial joint line.          Stienmann Test:  Positive   Lesvia Test:  Positive Medial joint line.         Bounce Home Test:  Negative   Thessaly Test:  Negative            KNEE - PATELLA: All findings relatively unchanged since previous  visit  Apprehension Test:  Positive   Glide Test:  Negative  Grind Test: Positive   Patella Tracking TestPositive              KNEE - QUADRICEPS:        All findings relatively unchanged since previous visit  VMO Test: Positive   VLO Test:  Negative    Hip/Pelvis - Sacrum:  Leg Length Supine: Positive LEFT leg shorter than the Right will verify with standing erect pelvis x-ray at a later date  Leg Length Supine to Seated (Derbolowsky Sign): Positive LEFT leg shorter than the Right will verify with standing erect pelvis x-ray at a later date    Hip Flexor Tightness: Positive RIGHT > Left  Hamstring Tightness: Positive LEFT > Right          Feet/Foot: Positive BILATERAL Valgus foot        Imaging and Diagnostics Review:  Interpreted By:  Matt Oshea and Baker Zachary   STUDY:  MRI of the  right knee without IV contrast;  10/16/2024 12:08 pm      INDICATION:  Signs/Symptoms:knee pain.      ,M25.561 Pain in right knee      COMPARISON:  Right knee radiographs on 10/07/2024.      ACCESSION NUMBER(S):  [id number]      ORDERING CLINICIAN:  GO HUGHES      TECHNIQUE:  MR imaging of the  right knee was obtained  without IV contrast.      FINDINGS:  LIGAMENTS AND TENDONS:  The anterior cruciate ligament demonstrates slight thickening and  increased intrasubstance signal without tear. The posterior cruciate  ligaments are intact. The medial collateral ligament is intact. The  lateral collateral ligament, the biceps femoris tendon, the popliteus  tendon and the iliotibial band are intact. The patellar tendon is  intact. There is mild increased intermediate signal intensity in the  insertional quadriceps tendon which is felt to be greater than the  normal fibrofatty striation particularly of the central to medial  insertion.      MENISCI:  The medial meniscus is intact and without evidence of tear.  The lateral meniscus is intact and without evidence of tear.      JOINTS:  The articular cartilage of the medial femoral  condyle and medial  tibial plateau is intact and without evidence of full thickness  defect. The articular cartilage of the lateral femoral condyle and  lateral tibial plateau is intact and without evidence of full  thickness defect. The patellofemoral articulation is intact and  without evidence of subluxation or articular cartilage defect. No  evidence of significant joint effusion or popliteal cyst.      OSSEOUS STRUCTURES:  Mild subchondral cyst-like changes within the proximal central tibia,  along the anterior cruciate ligament attachment site. There is  superior patellar enthesophyte formation. Marrow signal is otherwise  within normal limits. There is no acute fracture.      SOFT TISSUES:  Multiloculated cystic structure within the soft tissues along the  posterolateral distal femoral metaphysis measuring approximately 2.7  x 2.1 cm (series 4, image 11), appearing to be contiguous with the  joint. There is no muscle atrophy or tear.  The common peroneal nerve is intact.      IMPRESSION:  1. Mild insertional quadriceps tendinosis with enthesophyte formation.  2. Findings suggestive of mild mucoid degeneration of the anterior  cruciate ligament without tear.  3. Findings most compatible with a ganglion cyst at the posterior  lateral knee.      I personally reviewed the images/study and I agree with the findings  as stated. This study was interpreted at [place], [place].    Signed by: Matt Oshea 10/16/2024 3:10 PM  Dictation workstation:   [id number]    Assessment   1. Tendinosis of quadriceps tendon        2. Acute pain of right knee        3. Baker's cyst of knee, right        4. Patellar tracking disorder of right knee        5. Sprain of anterior cruciate ligament of knee, subsequent encounter        6. Patellofemoral dysfunction of right knee        7. Leg length discrepancy        8. Osteoarthritis of right knee, unspecified osteoarthritis type         9. Valgus foot deformity, acquired, unspecified laterality              Treatment or Intervention:  May continue to alternate ice and moist heat as needed  ,   Reviewed degenerative joint disease of the knee, , patellofemoral tracking syndrome, and ganglion cyst/Baker's cyst in detail with the patient to the level of their understanding  Start into Physical Therapy 1-2 times a week for 8-10 weeks with manual therapy as well as dry needling and IASTM especially VMO and adductor strengthening  Reviewed home exercises to be performed by the patient routinely  ,   Stressed the importance of wearing shoes with good stability control to help with the biomechanics affecting the knees and lower extremities  ,   Stressed the importance of wearing full foot insoles to help with the biomechanics affecting the knees and lower extremities  ,   Recommendation over-the-counter  vitamin-D 2 -3000+ milligrams a day, as well as a daily multivitamin.  ,   Recommendation over-the-counter curcumin, turmeric, boswellia, as well as egg shell membrane as directed to aid with joint inflammation.  ,   Recommendation over-the-counter Move Free for joint health.  ,    Discussed in detail with the patient to the level of their understanding the possibility in the future of regenerative injections versus corticosteroid injections versus viscosupplementation injections versus a combination  ,   Continue to wear neoprene knee sleeve with activity  Possibility of a playmaker knee brace or Cheikh pull knee brace in the future  AT the patient's next office visit, will provide appropriate __ millimeter heel lift to be placed in the RIGHT/LEFT shoe to accommodate for leg length discrepancy found on standing erect pelvis xray  Reviewed Right knee MRI in detail with the patient and/or patients parent/legal guardian to their level of understanding; a copy of these results were provided to the patient and/or patients parent/legal guardian at the time of  this office visit.   Follow up in January or sooner if needed          Please note that this report has been produced using speech recognition software.  It may contain errors related to grammar, punctuation or spelling.  Electronically signed, but not reviewed.  SOSA Jasso, Director of Sports Medicine      RM FARLEY on 10/18/24 at 11:06 AM.     HERMELINDA Jasso DO

## 2024-10-18 ENCOUNTER — OFFICE VISIT (OUTPATIENT)
Dept: SPORTS MEDICINE | Facility: CLINIC | Age: 44
End: 2024-10-18
Payer: COMMERCIAL

## 2024-10-18 VITALS
DIASTOLIC BLOOD PRESSURE: 80 MMHG | HEIGHT: 71 IN | BODY MASS INDEX: 27.3 KG/M2 | SYSTOLIC BLOOD PRESSURE: 120 MMHG | WEIGHT: 195 LBS | HEART RATE: 70 BPM

## 2024-10-18 DIAGNOSIS — M17.11 OSTEOARTHRITIS OF RIGHT KNEE, UNSPECIFIED OSTEOARTHRITIS TYPE: ICD-10-CM

## 2024-10-18 DIAGNOSIS — M71.21 BAKER'S CYST OF KNEE, RIGHT: ICD-10-CM

## 2024-10-18 DIAGNOSIS — S83.519D SPRAIN OF ANTERIOR CRUCIATE LIGAMENT OF KNEE, SUBSEQUENT ENCOUNTER: ICD-10-CM

## 2024-10-18 DIAGNOSIS — M25.861 PATELLOFEMORAL DYSFUNCTION OF RIGHT KNEE: ICD-10-CM

## 2024-10-18 DIAGNOSIS — M21.70 LEG LENGTH DISCREPANCY: ICD-10-CM

## 2024-10-18 DIAGNOSIS — M25.561 ACUTE PAIN OF RIGHT KNEE: ICD-10-CM

## 2024-10-18 DIAGNOSIS — M76.899 TENDINOSIS OF QUADRICEPS TENDON: Primary | ICD-10-CM

## 2024-10-18 DIAGNOSIS — M22.8X1 PATELLAR TRACKING DISORDER OF RIGHT KNEE: ICD-10-CM

## 2024-10-18 DIAGNOSIS — M21.079 VALGUS FOOT DEFORMITY, ACQUIRED, UNSPECIFIED LATERALITY: ICD-10-CM

## 2024-10-18 PROCEDURE — 99214 OFFICE O/P EST MOD 30 MIN: CPT | Performed by: FAMILY MEDICINE

## 2024-10-18 PROCEDURE — 3008F BODY MASS INDEX DOCD: CPT | Performed by: FAMILY MEDICINE

## 2024-10-18 PROCEDURE — 1036F TOBACCO NON-USER: CPT | Performed by: FAMILY MEDICINE

## 2024-10-18 ASSESSMENT — PAIN - FUNCTIONAL ASSESSMENT: PAIN_FUNCTIONAL_ASSESSMENT: 0-10

## 2024-10-18 ASSESSMENT — PATIENT HEALTH QUESTIONNAIRE - PHQ9
2. FEELING DOWN, DEPRESSED OR HOPELESS: NOT AT ALL
1. LITTLE INTEREST OR PLEASURE IN DOING THINGS: NOT AT ALL
SUM OF ALL RESPONSES TO PHQ9 QUESTIONS 1 AND 2: 0

## 2024-10-18 ASSESSMENT — COLUMBIA-SUICIDE SEVERITY RATING SCALE - C-SSRS
1. IN THE PAST MONTH, HAVE YOU WISHED YOU WERE DEAD OR WISHED YOU COULD GO TO SLEEP AND NOT WAKE UP?: NO
2. HAVE YOU ACTUALLY HAD ANY THOUGHTS OF KILLING YOURSELF?: NO
6. HAVE YOU EVER DONE ANYTHING, STARTED TO DO ANYTHING, OR PREPARED TO DO ANYTHING TO END YOUR LIFE?: NO

## 2024-10-18 ASSESSMENT — ENCOUNTER SYMPTOMS
LOSS OF SENSATION IN FEET: 0
DEPRESSION: 0
OCCASIONAL FEELINGS OF UNSTEADINESS: 0

## 2024-10-18 ASSESSMENT — PAIN SCALES - GENERAL
PAINLEVEL_OUTOF10: 2
PAINLEVEL_OUTOF10: 2

## 2024-10-18 ASSESSMENT — PAIN DESCRIPTION - DESCRIPTORS: DESCRIPTORS: ACHING;SORE

## 2024-10-18 NOTE — PATIENT INSTRUCTIONS
May continue to alternate ice and moist heat as needed  ,   Reviewed handout degenerative joint disease of the knees, meniscus injury, patellofemoral tracking syndrome, and/or patellar tendinitis in detail with the patient to the level of their understanding; a copy of this handout was provided to the patient at the time of this office visit.  ,   Start into Physical Therapy 1-2 times a week for 8-10 weeks with manual therapy as well as dry needling and IASTM especially VMO and adductor strengthening  Reviewed home exercises to be performed by the patient routinely  ,   Stressed the importance of wearing shoes with good stability control to help with the biomechanics affecting the knees and lower extremities  ,   Stressed the importance of wearing full foot insoles to help with the biomechanics affecting the knees and lower extremities  ,   Recommendation over-the-counter  vitamin-D 2 -3000+ milligrams a day, as well as a daily multivitamin.  ,   Recommendation over-the-counter curcumin, turmeric, boswellia, as well as egg shell membrane as directed to aid with joint inflammation.  ,   Recommendation over-the-counter Move Free for joint health.  ,    Discussed in detail with the patient to the level of their understanding the possibility in the future of regenerative injections versus corticosteroid injections versus viscosupplementation injections versus a combination  ,   AT the patient's next office visit, will provide appropriate __ millimeter heel lift to be placed in the RIGHT/LEFT shoe to accommodate for leg length discrepancy found on standing erect pelvis xray  Reviewed Right knee MRI in detail with the patient and/or patients parent/legal guardian to their level of understanding; a copy of these results were provided to the patient and/or patients parent/legal guardian at the time of this office visit.   Follow up as needed

## 2024-10-30 ENCOUNTER — APPOINTMENT (OUTPATIENT)
Dept: PHYSICAL THERAPY | Facility: CLINIC | Age: 44
End: 2024-10-30
Payer: COMMERCIAL

## 2025-02-14 ENCOUNTER — HOSPITAL ENCOUNTER (OUTPATIENT)
Dept: RADIOLOGY | Facility: CLINIC | Age: 45
Discharge: HOME | End: 2025-02-14
Payer: COMMERCIAL

## 2025-02-14 DIAGNOSIS — S49.92XA INJURY OF LEFT SHOULDER, INITIAL ENCOUNTER: ICD-10-CM

## 2025-02-14 PROCEDURE — 73030 X-RAY EXAM OF SHOULDER: CPT | Mod: LT

## 2025-02-20 NOTE — PROGRESS NOTES
"Verbal consent of the patient and/or verbal parental consent for patients under the age of 18 have been obtained to conduct a physical examination at this office visit.  The , GIA TAYLOR, was present in the room during the entire visit including, but not limited to the physical examination!    Established patient  History Of Present Illness  02/24/25 Arsenio Murphy is a 44 y.o. male who presents for an evaluation of their LEFT SHOULDER.  Patient reports previous history of LEFT shoulder injury sustained when he fell while ice skating approximately 7 years ago. Patient stated he was evaluated for the LEFT shoulder injury at that time and referred for physical therapy, but did not complete the visits because it did not fit his schedule and he had the HEP to continue. Patient relayed the L shoulder pain \"never truly went away\" and would increase when he golfed. Patient highlights that he was evaluated by Dr. Christopher for an unrelated knee injury, was referred for physical therapy, and came to the conclusion on his own that he needed to start exercises to \"self improve\". Patient reports he was doing pushups, pulls, and dips in a workout when he felt a \"pop\" superficially in the lateral aspect of his shoulder near the deltoids. Patient relays 10/10 pain at the time of injury, noting LEFT shoulder numbness, tingling, and weakness specifically in the morning when he would attempt to push himself up to sitting from lying in bed. Patient notes LEFT shoulder strength has improved, stating there is a 8/10 sharp pain during \"certain parts of my range of motion\" and when attempting to lift objects with the arm extended. Patient states he has been resting from the upper body exercises, and has been taking NSAIDS and Tylenol as well as applying ice or heat for the symptoms.      All previous Progress Notes and imaging results related to this patients chief complaint have been reviewed in preparation for this " examination.    Past Medical History  He has no past medical history on file.    Surgical History  He has no past surgical history on file.     Social History  He reports that he has never smoked. He has never used smokeless tobacco. He reports that he does not drink alcohol and does not use drugs.    Family History  Family History   Problem Relation Name Age of Onset    Ulcerative colitis Father          Allergies  Patient has no known allergies.    Review of Systems  CONSTITUTIONAL:   Negative for weight change, loss of appetite, fatigue, weakness, fever, chills, night sweats, headaches .           HEENT:   Negative for cold, cough, sore throat, sinus pain, swollen lymph nodes.           OPHTHALMOLOGY:   Negative for diminished vision, blurred vision, loss of vision, double vision.           ALLERGY:   Negative for runny nose, scratchy throat, sinus congestion, rash, facial pressure, nasal congestion, post-nasal drip.           CARDIOLOGY:   Negative for chest pain, palpitations, murmurs, irregular heart beat, shortness of breath, leg edema, dyspnea on exertion, fatigue, dizziness.           RESPIRATORY:   Negative for chest pain, shortness of breath, swelling of the legs, asthma/copd, chest congestion, pain with breathing .           GASTROENTEROLOGY:   Negative for nausea, vomitting, heartburn, constipation, diarrhea, blood in stool, change in bowel habits, black stool.           HEMATOLOGY/LYMPH:   Negative for fatigue, loss of appetitie, easy bruising, easy bleeding, anemia, abnormal bleeding, slow healing.           ENDOCRINOLOGY:   Negative for polyuria, polydipsia, polyphagia, fatigue, weight loss, weight gain, cold intolerance, heat intolerance, diabetes.           MUSCULOSKELETAL:   Positive  for Left SHOULDER PAIN       DERMATOLOGY:   Negative for rash, bruising.           NEUROLOGY:   Negative for tingling, numbness, gait abnormality, paresthesias, weakness, sciatica.        Examination:  Left Shoulder  "Lateral   Edema: Negative.   Ecchymosis/Bruising: Negative.   Percussion Test: Negative.   Tuning Fork Test: Negative.   Orientation: Asymmetrical.            Winging Scapula:   Positive  BILATERAL.     ROM: L shoulder \"burning\" when moving   FROM, Positive Causes pain in multiple motions  Forward Flexion (0-180 degrees) Positive Causes pain  Extension (0-60 degrees)  ABduction (0-180 degrees) Positive Causes pain  ADduction (30-50 degrees)  External Rotation with elbow at side (0-90 degrees)  Internal Rotation with elbow at side (0-70 degrees)  Horizontal ABduction (0-90 degrees) Positive Causes pain  Horizontal ADduction (0-45 degrees) Positive Causes pain  External Rotation with elbow at 90 degrees of ABduction [0-() degrees] Positive Causes pain  Internal Rotation with elbow at 90 degrees of ABduction [0-(70-90) degrees] Positive Causes pain    Positive  Apley's Shoulder Scratch Test Inferiorly Tests a Combination of: Extension, Internal Rotation, and Scapular ADduction - Decreased due to pain and swelling  LEFT arm reaches: T9  With \"burning\"     RIGHT arm reaches: T9    Muscle Strength: Positive muscle weakness noted throughout the rotator cuff  +4/+5:Internal Rotation - subscapularis  +4/+5: External Rotation - infraspinatus and teres minor  +4/+5:ABduction - supraspinatus and deltoid  +4/+5: ABduction with thumbs down and 30 degrees horizontal ADduction - supraspinatus  +4/+5: Palms up with elbow bent to 15 degrees flexion and resisted upward motion - biceps  +4/+5: Simultaneous resisted supination and elbow flexion- biceps  +5/+5:Flexion  +5/+5:Extension  +4/+5:ABduction  +4/+5:ADduction  +4/+5:Internal Rotation at 90 Degrees  +4/+5:External Rotation at 90 Degrees  +5/+5:Internal Rotation at 0 Degrees  +5/+5:External Rotation at 0 Degrees  +4/+5:Apley's Shoulder Scratch Test Superiorly Tests a Combination of: Flexion, External Rotation, and Scapular ABduction  +4/+5:Apley's Shoulder Scratch Test " "Inferiorly Tests a Combination of: Extension, Internal Rotation, and Scapular ADduction  +5/+5:Triceps  +5/+5: Biceps            Vascular:   Capillary Refillless than 2 seconds , Negative, Symmetrical:  +2/+4: Carotid pulse  +2/+4: Radial pulse  +2/+4: Ulnar pulse  +2/+4: Brachial pulse     Palpation:   Positive  Tenderness to Palpation Rotator Cuff interval especially at the insertion area of the supraspinatus and subscapularis as well as and biceps tendon.                            Shoulder - Subscapularis:  Bear Hug Test:  Positive    Lift Off Test:  Positive    [place] Test:  Positive    Resisted Elbow Push Down Test: Negative  Resisted Lift Off \"\" Test:  Positive      Shoulder - Supraspinatus:  Full Can Test: Negative  Empty Can Test:  Positive     Resisted Elbow Push Up Test:  Positive    Drop Arm Test:  Positive      Shoulder - Infraspinatus:  Resisted ER at 0 degrees ABduction:  Positive  with arm at side.               Shoulder - Teres Minor:  Resisted ER at 90 degrees ABduction:  Negative.         Shoulder - Biceps:  Speeds Test: Positive   Yergason Test: Positive     Shoulder - Labrum/Instability:  Anterior Release/Surprise Test: Negative  Bicep Flexion at 90 degrees ABduction Test: Negative    Posterior Apprehension Test: Negative  Posterior Release/Surprise Test: Negative   Clunk Test: Negative  Grind Test: Negative    [place] Test: Negative  Crank Test: Negative  Horizontal Adduction Thumb Down: Negative     Imaging and Diagnostics Review:  XR shoulder left 2+ views 2/14/2025  Narrative & Impression   Interpreted By:  Jesse Burleson,   STUDY:  XR SHOULDER LEFT 2+ VIEWS      INDICATION:  Signs/Symptoms:shoulder injury.      COMPARISON:  March 20, 2018      ACCESSION NUMBER(S):  [id number]      ORDERING CLINICIAN:  GO HUGHES      FINDINGS:  No osseous, articular, or soft tissue abnormality.      IMPRESSION:  Normal radiographs left shoulder.      Signed by: Jesse Burleson 2/14/2025 3:11 " PM  Dictation workstation:   [id number]       Assessment   1. Acute pain of left shoulder        2. Rotator cuff strain, left, initial encounter        3. Rotator cuff impingement syndrome of left shoulder        4. Strain of left subscapularis muscle, initial encounter        5. Strain of left supraspinatus muscle or tendon        6. Biceps tendinitis of left shoulder        7. Shoulder instability, left          Treatment or Intervention:  May continue alternating ice and moist heat therapy as needed  ,   Reviewed handout rotator cuff injury and/or labral injury as well as rotator cuff impingement in detail with the patient to the level of their understanding; at the time of this office visit.  ,   Start into Physical Therapy 1-2 times a week for 8-10 weeks with manual therapy as well as dry needling and IASTM  ,   Reviewed home exercises to be performed by the patient routinely, including wall crawls, circumduction exercises, resistance band exercises, as well as additional exercises from rotator cuff and/or labral  ,   Recommendation over-the-counter  vitamin-D 2 -3000+ milligrams a day, as well as  a daily multivitamin.  ,   At follow-up visit recommendation over-the-counter curcumin, turmeric, boswellia, as well as egg shell membrane as directed to aid with joint inflammation.  ,   At follow-up visit recommendation over-the-counter Move Free for joint health.  ,   Patient advised regarding the risks and/or potential adverse reactions and/or side effects of any prescribed medications along with any over-the-counter medications or any supplements used. Patient advised to seek immediate medical care if any adverse reactions occur. The patient and/or patient(s) parent(s) verbalized their understanding.    Discussed in detail with the patient to the level of their understanding the possibility in the future of regenerative injections versus corticosteroid injections   MRI of the left shoulder rule out rotator  cuff tear as well as further evaluate for rotator cuff impingement versus other  Follow-up after LEFT shoulder MRI or in 2-4 weeks for a reevaluation or sooner as needed      Please note that this report has been produced using speech recognition software.  It may contain errors related to grammar, punctuation or spelling.  Electronically signed, but not reviewed.  SOSA Jasso, Director of Sports Medicine    GIA SUGGS on 2/24/25 at 11:48 AM.     HERMELINDA Jasso DO

## 2025-02-24 ENCOUNTER — OFFICE VISIT (OUTPATIENT)
Dept: SPORTS MEDICINE | Facility: CLINIC | Age: 45
End: 2025-02-24
Payer: COMMERCIAL

## 2025-02-24 VITALS
SYSTOLIC BLOOD PRESSURE: 114 MMHG | DIASTOLIC BLOOD PRESSURE: 70 MMHG | HEART RATE: 64 BPM | WEIGHT: 188 LBS | BODY MASS INDEX: 26.32 KG/M2 | HEIGHT: 71 IN

## 2025-02-24 DIAGNOSIS — S46.012A ROTATOR CUFF STRAIN, LEFT, INITIAL ENCOUNTER: ICD-10-CM

## 2025-02-24 DIAGNOSIS — S46.812A STRAIN OF LEFT SUBSCAPULARIS MUSCLE, INITIAL ENCOUNTER: ICD-10-CM

## 2025-02-24 DIAGNOSIS — M25.312 SHOULDER INSTABILITY, LEFT: ICD-10-CM

## 2025-02-24 DIAGNOSIS — S46.812A STRAIN OF LEFT SUPRASPINATUS MUSCLE OR TENDON: ICD-10-CM

## 2025-02-24 DIAGNOSIS — M75.22 BICEPS TENDINITIS OF LEFT SHOULDER: ICD-10-CM

## 2025-02-24 DIAGNOSIS — M25.512 ACUTE PAIN OF LEFT SHOULDER: ICD-10-CM

## 2025-02-24 DIAGNOSIS — M75.42 ROTATOR CUFF IMPINGEMENT SYNDROME OF LEFT SHOULDER: ICD-10-CM

## 2025-02-24 PROCEDURE — 3008F BODY MASS INDEX DOCD: CPT | Performed by: FAMILY MEDICINE

## 2025-02-24 PROCEDURE — 99214 OFFICE O/P EST MOD 30 MIN: CPT | Performed by: FAMILY MEDICINE

## 2025-02-24 PROCEDURE — 1036F TOBACCO NON-USER: CPT | Performed by: FAMILY MEDICINE

## 2025-02-24 ASSESSMENT — PATIENT HEALTH QUESTIONNAIRE - PHQ9
1. LITTLE INTEREST OR PLEASURE IN DOING THINGS: NOT AT ALL
2. FEELING DOWN, DEPRESSED OR HOPELESS: NOT AT ALL
SUM OF ALL RESPONSES TO PHQ9 QUESTIONS 1 AND 2: 0

## 2025-02-24 ASSESSMENT — ENCOUNTER SYMPTOMS
OCCASIONAL FEELINGS OF UNSTEADINESS: 0
DEPRESSION: 0
LOSS OF SENSATION IN FEET: 0

## 2025-02-24 ASSESSMENT — COLUMBIA-SUICIDE SEVERITY RATING SCALE - C-SSRS
2. HAVE YOU ACTUALLY HAD ANY THOUGHTS OF KILLING YOURSELF?: NO
6. HAVE YOU EVER DONE ANYTHING, STARTED TO DO ANYTHING, OR PREPARED TO DO ANYTHING TO END YOUR LIFE?: NO
1. IN THE PAST MONTH, HAVE YOU WISHED YOU WERE DEAD OR WISHED YOU COULD GO TO SLEEP AND NOT WAKE UP?: NO

## 2025-02-24 ASSESSMENT — PAIN SCALES - GENERAL: PAINLEVEL_OUTOF10: 8

## 2025-02-24 NOTE — PATIENT INSTRUCTIONS
May continue PRICE therapy as needed  ,   Reviewed handout rotator cuff injury and/or labral injury in detail with the patient to the level of their understanding; a copy of this handout was provided to the patient at the time of this office visit.  ,   Start into Physical Therapy 1-2 times a week for 8-10 weeks with manual therapy as well as dry needling and IASTM  ,   Reviewed home exercises to be performed by the patient routinely, including wall crawls, circumduction exercises, resistance band exercises, as well as additional exercises from rotator cuff and/or labral  ,   Recommendation over-the-counter calcium with vitamin-D 2 -3000+ milligrams a day, as well as OTC symphytum as directed daily to promote bony healing, in addition to a daily multivitamin.  ,   Recommendation over-the-counter curcumin, turmeric, boswellia, as well as egg shell membrane as directed to aid with joint inflammation.  ,   Recommendation over-the-counter Move Free for joint health.  ,   Patient advised regarding the risks and/or potential adverse reactions and/or side effects of any prescribed medications along with any over-the-counter medications or any supplements used. Patient advised to seek immediate medical care if any adverse reactions occur. The patient and/or patient(s) parent(s) verbalized their understanding.    Discussed in detail with the patient to the level of their understanding the possibility in the future of regenerative injections versus corticosteroid injections   Follow-up after LEFT shoulder MRI or in 2-4 weeks for a reevaluation or sooner as needed      You have been ordered an MRI/MR Arthrogram of the LEFT shoulder. Once you contact scheduling at [phone] and obtain the date and time of your MRI/MR Arthrogram, contact our office at [phone] to schedule your follow-up appointment to review your results.

## 2025-03-08 ENCOUNTER — HOSPITAL ENCOUNTER (OUTPATIENT)
Dept: RADIOLOGY | Facility: CLINIC | Age: 45
Discharge: HOME | End: 2025-03-08
Payer: COMMERCIAL

## 2025-03-08 DIAGNOSIS — M25.312 SHOULDER INSTABILITY, LEFT: ICD-10-CM

## 2025-03-08 DIAGNOSIS — S46.812A STRAIN OF LEFT SUBSCAPULARIS MUSCLE, INITIAL ENCOUNTER: ICD-10-CM

## 2025-03-08 DIAGNOSIS — M25.512 ACUTE PAIN OF LEFT SHOULDER: ICD-10-CM

## 2025-03-08 DIAGNOSIS — M75.22 BICEPS TENDINITIS OF LEFT SHOULDER: ICD-10-CM

## 2025-03-08 DIAGNOSIS — S46.012A ROTATOR CUFF STRAIN, LEFT, INITIAL ENCOUNTER: ICD-10-CM

## 2025-03-08 DIAGNOSIS — S46.812A STRAIN OF LEFT SUPRASPINATUS MUSCLE OR TENDON: ICD-10-CM

## 2025-03-08 DIAGNOSIS — M75.42 ROTATOR CUFF IMPINGEMENT SYNDROME OF LEFT SHOULDER: ICD-10-CM

## 2025-03-08 PROCEDURE — 73221 MRI JOINT UPR EXTREM W/O DYE: CPT | Mod: LT

## 2025-03-31 NOTE — PROGRESS NOTES
"Verbal consent of the patient and/or verbal parental consent for patients under the age of 18 have been obtained to conduct a physical examination at this office visit.  The , GIA TAYLOR, was present in the room during the entire visit including, but not limited to the physical examination!    Established patient  History Of Present Illness  03/31/25 Arsenio Murphy is a 44 y.o. male who presents for MRI review of their LEFT SHOULDER.      All previous Progress Notes and imaging results related to this patients chief complaint have been reviewed in preparation for this examination.    Past Medical History  He has no past medical history on file.    Surgical History  He has no past surgical history on file.     Social History  He reports that he has never smoked. He has never used smokeless tobacco. He reports that he does not drink alcohol and does not use drugs.    Family History  Family History   Problem Relation Name Age of Onset    Ulcerative colitis Father       Historical Clinical Intake:   02/24/25 Arsenio Murphy is a 44 y.o. male who presents for an evaluation of their LEFT SHOULDER.  Patient reports previous history of LEFT shoulder injury sustained when he fell while ice skating approximately 7 years ago. Patient stated he was evaluated for the LEFT shoulder injury at that time and referred for physical therapy, but did not complete the visits because it did not fit his schedule and he had the HEP to continue. Patient relayed the L shoulder pain \"never truly went away\" and would increase when he golfed. Patient highlights that he was evaluated by Dr. Christopher for an unrelated knee injury, was referred for physical therapy, and came to the conclusion on his own that he needed to start exercises to \"self improve\". Patient reports he was doing pushups, pulls, and dips in a workout when he felt a \"pop\" superficially in the lateral aspect of his shoulder near the deltoids. Patient relays 10/10 pain " "at the time of injury, noting LEFT shoulder numbness, tingling, and weakness specifically in the morning when he would attempt to push himself up to sitting from lying in bed. Patient notes LEFT shoulder strength has improved, stating there is a 8/10 sharp pain during \"certain parts of my range of motion\" and when attempting to lift objects with the arm extended. Patient states he has been resting from the upper body exercises, and has been taking NSAIDS and Tylenol as well as applying ice or heat for the symptoms.   Allergies  Patient has no known allergies.    Review of Systems  CONSTITUTIONAL:   Negative for weight change, loss of appetite, fatigue, weakness, fever, chills, night sweats, headaches .           HEENT:   Negative for cold, cough, sore throat, sinus pain, swollen lymph nodes.           OPHTHALMOLOGY:   Negative for diminished vision, blurred vision, loss of vision, double vision.           ALLERGY:   Negative for runny nose, scratchy throat, sinus congestion, rash, facial pressure, nasal congestion, post-nasal drip.           CARDIOLOGY:   Negative for chest pain, palpitations, murmurs, irregular heart beat, shortness of breath, leg edema, dyspnea on exertion, fatigue, dizziness.           RESPIRATORY:   Negative for chest pain, shortness of breath, swelling of the legs, asthma/copd, chest congestion, pain with breathing .           GASTROENTEROLOGY:   Negative for nausea, vomitting, heartburn, constipation, diarrhea, blood in stool, change in bowel habits, black stool.           HEMATOLOGY/LYMPH:   Negative for fatigue, loss of appetitie, easy bruising, easy bleeding, anemia, abnormal bleeding, slow healing.           ENDOCRINOLOGY:   Negative for polyuria, polydipsia, polyphagia, fatigue, weight loss, weight gain, cold intolerance, heat intolerance, diabetes.           MUSCULOSKELETAL:   Positive  for Left SHOULDER PAIN       DERMATOLOGY:   Negative for rash, bruising.           NEUROLOGY: " "  Negative for tingling, numbness, gait abnormality, paresthesias, weakness, sciatica.        Examination:  Left Shoulder Lateral   Edema: Negative.   Ecchymosis/Bruising: Negative.   Percussion Test: Negative.   Tuning Fork Test: Negative.   Orientation: Asymmetrical.            Winging Scapula:   Positive  BILATERAL.     ROM: L shoulder \"burning\" when moving   FROM, Positive Causes pain in multiple motions  Forward Flexion (0-180 degrees) Positive Causes pain  Extension (0-60 degrees)  ABduction (0-180 degrees) Positive Causes pain  ADduction (30-50 degrees)  External Rotation with elbow at side (0-90 degrees)  Internal Rotation with elbow at side (0-70 degrees)  Horizontal ABduction (0-90 degrees) Positive Causes pain  Horizontal ADduction (0-45 degrees) Positive Causes pain  External Rotation with elbow at 90 degrees of ABduction [0-() degrees] Positive Causes pain  Internal Rotation with elbow at 90 degrees of ABduction [0-(70-90) degrees] Positive Causes pain    Positive  Apley's Shoulder Scratch Test Inferiorly Tests a Combination of: Extension, Internal Rotation, and Scapular ADduction - Decreased due to pain and swelling  LEFT arm reaches: T9  With \"burning\"     RIGHT arm reaches: T9    Muscle Strength: Positive muscle weakness noted throughout the rotator cuff  +4/+5:Internal Rotation - subscapularis  +4/+5: External Rotation - infraspinatus and teres minor  +4/+5:ABduction - supraspinatus and deltoid  +4/+5: ABduction with thumbs down and 30 degrees horizontal ADduction - supraspinatus  +4/+5: Palms up with elbow bent to 15 degrees flexion and resisted upward motion - biceps  +4/+5: Simultaneous resisted supination and elbow flexion- biceps  +5/+5:Flexion  +5/+5:Extension  +4/+5:ABduction  +4/+5:ADduction  +4/+5:Internal Rotation at 90 Degrees  +4/+5:External Rotation at 90 Degrees  +5/+5:Internal Rotation at 0 Degrees  +5/+5:External Rotation at 0 Degrees  +4/+5:Apley's Shoulder Scratch Test " "Superiorly Tests a Combination of: Flexion, External Rotation, and Scapular ABduction  +4/+5:Apley's Shoulder Scratch Test Inferiorly Tests a Combination of: Extension, Internal Rotation, and Scapular ADduction  +5/+5:Triceps  +5/+5: Biceps            Vascular:   Capillary Refillless than 2 seconds , Negative, Symmetrical:  +2/+4: Carotid pulse  +2/+4: Radial pulse  +2/+4: Ulnar pulse  +2/+4: Brachial pulse     Palpation:   Positive  Tenderness to Palpation Rotator Cuff interval especially at the insertion area of the supraspinatus and subscapularis as well as and biceps tendon.                            Shoulder - Subscapularis:  Bear Hug Test:  Positive    Lift Off Test:  Positive    [place] Test:  Positive    Resisted Elbow Push Down Test: Negative  Resisted Lift Off \"\" Test:  Positive      Shoulder - Supraspinatus:  Full Can Test: Negative  Empty Can Test:  Positive     Resisted Elbow Push Up Test:  Positive    Drop Arm Test:  Positive      Shoulder - Infraspinatus:  Resisted ER at 0 degrees ABduction:  Positive  with arm at side.               Shoulder - Teres Minor:  Resisted ER at 90 degrees ABduction:  Negative.         Shoulder - Biceps:  Speeds Test: Positive   Yergason Test: Positive     Shoulder - Labrum/Instability:  Anterior Release/Surprise Test: Negative  Bicep Flexion at 90 degrees ABduction Test: Negative    Posterior Apprehension Test: Negative  Posterior Release/Surprise Test: Negative   Clunk Test: Negative  Grind Test: Negative    [place] Test: Negative  Crank Test: Negative  Horizontal Adduction Thumb Down: Negative     Imaging and Diagnostics Review:  MR shoulder left wo IV contrast  Interpreted By:  Jesse Burleson,   STUDY:  MR SHOULDER LEFT WO IV CONTRAST;  FINDINGS:  Evaluation of the rotator cuff demonstrates some mild supraspinatus  tendinosis. There is no evidence of rotator cuff tear. The other  rotator cuff tendons are normal without atrophy or edema.      Biceps intact.    "   No evidence of labral tear.      No glenohumeral chondral defects.      No glenohumeral ligamentous abnormality.      No sizeable effusion.      Small focus of marrow edema at the anterior aspect of the greater  tuberosity of the humeral head, nonspecific. This could potentially  be a small osseous contusion.      No evidence of fracture.      No marrow replacement lesion.      Mild acromioclavicular osteoarthritis.      No focal fluid collection.      No other muscular signal abnormality.      No evidence of a soft tissue mass.          IMPRESSION:  Mild supraspinatus tendinosis without evidence of rotator cuff tear.    Small focus of marrow edema greater tuberosity of the humeral head,  possibly a small osseous contusion.    Mild acromioclavicular osteoarthritis.      Signed by: Jesse Burleson 3/8/2025 1:14 PM  Dictation workstation:   [id number]     --------------------------------------------------------  XR shoulder left 2+ views 2/14/2025  Narrative & Impression   Interpreted By:  Jesse Burleson,   STUDY:  XR SHOULDER LEFT 2+ VIEWS      INDICATION:  Signs/Symptoms:shoulder injury.      COMPARISON:  March 20, 2018      ACCESSION NUMBER(S):  [id number]      ORDERING CLINICIAN:  GO HUGHES      FINDINGS:  No osseous, articular, or soft tissue abnormality.      IMPRESSION:  Normal radiographs left shoulder.      Signed by: Jesse Burleson 2/14/2025 3:11 PM  Dictation workstation:   [id number]       Assessment   No diagnosis found.    Treatment or Intervention:  May continue alternating ice and moist heat therapy as needed  ,   Reviewed handout rotator cuff injury and/or labral injury as well as rotator cuff impingement in detail with the patient to the level of their understanding; at the time of this office visit.  ,   Start into Physical Therapy 1-2 times a week for 8-10 weeks with manual therapy as well as dry needling and IASTM  ,   Reviewed home exercises to be performed by the patient routinely, including  wall crawls, circumduction exercises, resistance band exercises, as well as additional exercises from rotator cuff and/or labral  ,   Recommendation over-the-counter  vitamin-D 2 -3000+ milligrams a day, as well as  a daily multivitamin.  ,   At follow-up visit recommendation over-the-counter curcumin, turmeric, boswellia, as well as egg shell membrane as directed to aid with joint inflammation.  ,   At follow-up visit recommendation over-the-counter Move Free for joint health.  ,   Patient advised regarding the risks and/or potential adverse reactions and/or side effects of any prescribed medications along with any over-the-counter medications or any supplements used. Patient advised to seek immediate medical care if any adverse reactions occur. The patient and/or patient(s) parent(s) verbalized their understanding.    Discussed in detail with the patient to the level of their understanding the possibility in the future of regenerative injections versus corticosteroid injections   Reviewed left shoulder MRI in detail with the patient and/or patients parent/legal guardian to their level of understanding; a copy of these results were provided to the patient and/or patients parent/legal guardian at the time of this office visit.   Follow-up     Please note that this report has been produced using speech recognition software.  It may contain errors related to grammar, punctuation or spelling.  Electronically signed, but not reviewed.  SOSA Jasso, Director of Sports Medicine    STEFANO LOU on 3/31/25 at 7:54 AM.     HERMELINDA Jasso DO

## 2025-04-01 ENCOUNTER — APPOINTMENT (OUTPATIENT)
Dept: SPORTS MEDICINE | Facility: CLINIC | Age: 45
End: 2025-04-01
Payer: COMMERCIAL

## 2025-04-01 DIAGNOSIS — M25.861 PATELLOFEMORAL DYSFUNCTION OF RIGHT KNEE: ICD-10-CM

## 2025-04-01 DIAGNOSIS — M25.512 ACUTE PAIN OF LEFT SHOULDER: ICD-10-CM

## 2025-04-01 DIAGNOSIS — M75.22 BICEPS TENDINITIS OF LEFT SHOULDER: ICD-10-CM

## 2025-04-01 DIAGNOSIS — S46.812A STRAIN OF LEFT SUBSCAPULARIS MUSCLE, INITIAL ENCOUNTER: ICD-10-CM

## 2025-04-01 DIAGNOSIS — S46.812A STRAIN OF LEFT SUPRASPINATUS MUSCLE OR TENDON: ICD-10-CM

## 2025-04-01 DIAGNOSIS — S83.519D SPRAIN OF ANTERIOR CRUCIATE LIGAMENT OF KNEE, SUBSEQUENT ENCOUNTER: ICD-10-CM

## 2025-04-01 DIAGNOSIS — M25.312 SHOULDER INSTABILITY, LEFT: ICD-10-CM

## 2025-04-01 DIAGNOSIS — M75.42 ROTATOR CUFF IMPINGEMENT SYNDROME OF LEFT SHOULDER: ICD-10-CM

## 2025-04-01 DIAGNOSIS — M22.8X1 PATELLAR TRACKING DISORDER OF RIGHT KNEE: ICD-10-CM

## 2025-04-01 DIAGNOSIS — S46.012A ROTATOR CUFF STRAIN, LEFT, INITIAL ENCOUNTER: ICD-10-CM

## 2025-04-01 DIAGNOSIS — M71.21 BAKER'S CYST OF KNEE, RIGHT: ICD-10-CM

## 2025-04-01 DIAGNOSIS — M25.561 ACUTE PAIN OF RIGHT KNEE: ICD-10-CM
